# Patient Record
Sex: MALE | Race: WHITE | URBAN - NONMETROPOLITAN AREA
[De-identification: names, ages, dates, MRNs, and addresses within clinical notes are randomized per-mention and may not be internally consistent; named-entity substitution may affect disease eponyms.]

---

## 2017-12-19 ENCOUNTER — TELEPHONE (OUTPATIENT)
Dept: FAMILY MEDICINE | Facility: OTHER | Age: 42
End: 2017-12-19

## 2017-12-19 NOTE — TELEPHONE ENCOUNTER
Patient called to schedule appointment for a hospital follow-up    Patient was admitted to:  [facility]   Discharged date: 12/19/17  Reason for hospital admission: Suicidal   Does patient have future appointment scheduled with provider? Yes   Date of future appointment:  12/28/17    This information will be used to help the care team plan for the patients upcoming visit.  The triage RN may determine that a follow up call is necessary and reach out to the patient via the phone number listed in the chart.     Please route this message on routine priority to the Triage RN pool.

## 2017-12-19 NOTE — TELEPHONE ENCOUNTER
Patient is being discharged today. Will postpone message to tomorrow and follow up with patient then.     Roxanna Covarrubias RN  [facility]

## 2017-12-19 NOTE — TELEPHONE ENCOUNTER
"SUBJECTIVE:                                                      Patient presents for Hospital Followup Visit:    Hospital:  {[facility]:5424182}      Date of Admission: ***  Date of Discharge: ***  Reason(s) for Admission: ***            Problems taking medications regularly:  {NONE DEFAULTED:142684::\"None\"}       Medication changes since discharge: {NONE DEFAULTED:817474::\"None\"}       Problems adhering to non-medication therapy:  {NONE DEFAULTED:280359::\"None\"}  {roomer to stop here, delete this reminder}  Summary of hospitalization:  {HOSPITAL DISCHARGE SUMMARY INFO:483686::\"[place] hospital discharge summary reviewed\"}  Diagnostic Tests/Treatments reviewed.  Follow up needed: {NONE DEFAULTED:497581::\"none\"}  Other Healthcare Providers Involved in Patient s Care:         {those currently involved after discharge:189276::\"None\"}  Update since discharge: {IMPROVED DEFAULT:888251::\"improved.\"} {include information from family, SNF, care coordination}    ROS:  {ROS SHORT:198687}    OBJECTIVE:                                                      {SHORT EXAM:671518}    ASSESSMENT/PLAN:                                                      There are no diagnoses linked to this encounter.     Post Discharge Medication Reconciliation: {ACO Med Rec (Provider):093796}.  Issues to address: {Issues :705939}  Plan of care communicated with {Communicate Plan to:036068::\"patient\"}      Type of Medical Decision Making Face-to-Face Visit within 7 Days Face-to-Face Visit within 14 days   Moderate Complexity 80708 [id number]   High Complexity [id number] [id number]                 "

## 2017-12-20 NOTE — TELEPHONE ENCOUNTER
"Hospital/TCU/ED for chronic condition Discharge Protocol    \"Hi, my name is Roxanna Covarrubias, a registered nurse, and I am calling from [facility]  I am calling to follow up and see how things are going for you after your recent emergency visit/hospital/TCU stay.\"    Tell me how you are doing now that you are home?\" Doing good. I have no concerns at this time.\"      Discharge Instructions    \"Let's review your discharge instructions.  What is/are the follow-up recommendations?  Pt. Response: Follow up in clinic    \"Has an appointment with your primary care provider been scheduled?\"   Yes. (confirm)    \"When you see the provider, I would recommend that you bring your medications with you.\"    Medications    \"Tell me what changed about your medicines when you discharged?\"    Changes to chronic meds?    0-1    \"What questions do you have about your medications?\"    None     New diagnoses of heart failure, COPD, diabetes, or MI?    No                  Medication reconciliation completed? Yes  Was MTM referral placed (*Make sure to put transitions as reason for referral)?   No    Call Summary    \"What questions or concerns do you have about your recent visit and your follow-up care?\"     none    \"If you have questions or things don't continue to improve, we encourage you contact us through the main clinic number (give number).  Even if the clinic is not open, triage nurses are available 24/7 to help you.     We would like you to know that our clinic has extended hours (provide information).  We also have urgent care (provide details on closest location and hours/contact info)\"      \"Thank you for your time and take care!\"    Roxanna Covarrubias RN  [facility]  "

## 2017-12-28 ENCOUNTER — OFFICE VISIT (OUTPATIENT)
Dept: FAMILY MEDICINE | Facility: OTHER | Age: 42
End: 2017-12-28
Payer: COMMERCIAL

## 2017-12-28 VITALS
SYSTOLIC BLOOD PRESSURE: 132 MMHG | DIASTOLIC BLOOD PRESSURE: 82 MMHG | OXYGEN SATURATION: 99 % | HEIGHT: 68 IN | HEART RATE: 94 BPM | BODY MASS INDEX: 30.05 KG/M2 | WEIGHT: 198.3 LBS | RESPIRATION RATE: 14 BRPM | TEMPERATURE: 97.4 F

## 2017-12-28 DIAGNOSIS — F10.10 ALCOHOL ABUSE: ICD-10-CM

## 2017-12-28 DIAGNOSIS — F41.9 ANXIETY: Primary | ICD-10-CM

## 2017-12-28 PROBLEM — R45.851 SUICIDAL IDEATION: Status: ACTIVE | Noted: 2017-12-18

## 2017-12-28 PROCEDURE — 99214 OFFICE O/P EST MOD 30 MIN: CPT | Performed by: FAMILY MEDICINE

## 2017-12-28 RX ORDER — PAROXETINE 20 MG/1
20 TABLET, FILM COATED ORAL AT BEDTIME
Qty: 30 TABLET | Refills: 1 | Status: SHIPPED | OUTPATIENT
Start: 2017-12-28 | End: 2018-02-08

## 2017-12-28 ASSESSMENT — ANXIETY QUESTIONNAIRES
3. WORRYING TOO MUCH ABOUT DIFFERENT THINGS: MORE THAN HALF THE DAYS
GAD7 TOTAL SCORE: 15
5. BEING SO RESTLESS THAT IT IS HARD TO SIT STILL: NEARLY EVERY DAY
7. FEELING AFRAID AS IF SOMETHING AWFUL MIGHT HAPPEN: SEVERAL DAYS
2. NOT BEING ABLE TO STOP OR CONTROL WORRYING: MORE THAN HALF THE DAYS
IF YOU CHECKED OFF ANY PROBLEMS ON THIS QUESTIONNAIRE, HOW DIFFICULT HAVE THESE PROBLEMS MADE IT FOR YOU TO DO YOUR WORK, TAKE CARE OF THINGS AT HOME, OR GET ALONG WITH OTHER PEOPLE: VERY DIFFICULT
6. BECOMING EASILY ANNOYED OR IRRITABLE: SEVERAL DAYS
1. FEELING NERVOUS, ANXIOUS, OR ON EDGE: NEARLY EVERY DAY

## 2017-12-28 ASSESSMENT — PAIN SCALES - GENERAL: PAINLEVEL: NO PAIN (0)

## 2017-12-28 ASSESSMENT — PATIENT HEALTH QUESTIONNAIRE - PHQ9
5. POOR APPETITE OR OVEREATING: NEARLY EVERY DAY
SUM OF ALL RESPONSES TO PHQ QUESTIONS 1-9: 7

## 2017-12-28 NOTE — MR AVS SNAPSHOT
"              After Visit Summary   2017    Kevin Florian    MRN: [id number]           Patient Information     Date Of Birth          1975        Visit Information        Provider Department      2017 8:40 AM Rome Leslie MD [facility]        Today's Diagnoses     Anxiety    -  1    Alcohol abuse           Follow-ups after your visit        Follow-up notes from your care team     Return in about 1 month (around 2018) for recheck anxiety and depression.      Who to contact     If you have questions or need follow up information about today's clinic visit or your schedule please contact [facility] directly at [phone].  Normal or non-critical lab and imaging results will be communicated to you by EyeIChart, letter or phone within 4 business days after the clinic has received the results. If you do not hear from us within 7 days, please contact the clinic through EyeIChart or phone. If you have a critical or abnormal lab result, we will notify you by phone as soon as possible.  Submit refill requests through [employer] or call your pharmacy and they will forward the refill request to us. Please allow 3 business days for your refill to be completed.          Additional Information About Your Visit        MyChart Information     [employer] lets you send messages to your doctor, view your test results, renew your prescriptions, schedule appointments and more. To sign up, go to [email or web address] . Click on \"Log in\" on the left side of the screen, which will take you to the Welcome page. Then click on \"Sign up Now\" on the right side of the page.     You will be asked to enter the access code listed below, as well as some personal information. Please follow the directions to create your username and password.     Your access code is: [email or web address]  Expires: 3/28/2018  9:30 AM     Your access code will  in 90 days. If you need help or a new code, please call your " "[facility] or [phone].        Care EveryWhere ID     This is your Care EveryWhere ID. This could be used by other organizations to access your [facility] medical records  [id number]        Your Vitals Were     Pulse Temperature Respirations Height Pulse Oximetry BMI (Body Mass Index)    94 97.4  F (36.3  C) (Temporal) 14 5' 8.2\" (1.732 m) 99% 29.97 kg/m2       Blood Pressure from Last 3 Encounters:   12/28/17 132/82   05/10/16 149/81   03/01/16 135/84    Weight from Last 3 Encounters:   12/28/17 198 lb 4.8 oz (89.9 kg)   05/10/16 195 lb (88.5 kg)   03/01/16 195 lb (88.5 kg)              Today, you had the following     No orders found for display         Today's Medication Changes          These changes are accurate as of: 12/28/17  9:30 AM.  If you have any questions, ask your nurse or doctor.               Start taking these medicines.        Dose/Directions    PARoxetine 20 MG tablet   Commonly known as:  PAXIL   Used for:  Anxiety, Alcohol abuse   Started by:  Rome Leslie MD        Dose:  20 mg   Take 1 tablet (20 mg) by mouth At Bedtime   Quantity:  30 tablet   Refills:  1            Where to get your medicines      These medications were sent to [id number] - [place], [place] - [address] [place] [address]    Hours:  M-F 8:30-6:30; Sat 9-4; closed Sunday Phone:  [phone]     PARoxetine 20 MG tablet                Primary Care Provider Office Phone # Fax #    Rome Leslie [phone]       [address] [facility] [address]        Equal Access to Services     [facility] HospitalDENY AH: Hadii karissa Lagunas, waabhishekda luqadaha, qaybta kaalmada alberto, arnel hernandez. So [facility] [phone].    ATENCIÓN: Si habla español, tiene a curry disposición servicios gratuitos de asistencia lingüística. Llame al [phone].    We comply with applicable federal civil rights laws and [place] laws. We do not discriminate on the basis of race, " color, national origin, age, disability, sex, sexual orientation, or gender identity.            Thank you!     Thank you for choosing [facility]  for your care. Our goal is always to provide you with excellent care. Hearing back from our patients is one way we can continue to improve our services. Please take a few minutes to complete the written survey that you may receive in the mail after your visit with us. Thank you!             Your Updated Medication List - Protect others around you: Learn how to safely use, store and throw away your medicines at www.disposemymeds.org.          This list is accurate as of: 12/28/17  9:30 AM.  Always use your most recent med list.                   Brand Name Dispense Instructions for use Diagnosis    fluticasone 50 MCG/ACT spray    FLONASE    16 g    INHALE 2 SPRAYS INTO EACH NOSTRIL EVERY DAY    Acute recurrent maxillary sinusitis       PARoxetine 20 MG tablet    PAXIL    30 tablet    Take 1 tablet (20 mg) by mouth At Bedtime    Anxiety, Alcohol abuse

## 2017-12-28 NOTE — NURSING NOTE
"Chief Complaint   Patient presents with     RECHECK       Initial /82 (BP Location: Right arm, Patient Position: Chair, Cuff Size: Adult Large)  Pulse 94  Temp 97.4  F (36.3  C) (Temporal)  Resp 14  Ht 5' 8.2\" (1.732 m)  Wt 198 lb 4.8 oz (89.9 kg)  SpO2 99%  BMI 29.97 kg/m2 Estimated body mass index is 29.97 kg/(m^2) as calculated from the following:    Height as of this encounter: 5' 8.2\" (1.732 m).    Weight as of this encounter: 198 lb 4.8 oz (89.9 kg).  Medication Reconciliation: complete     Cathi Reynolds MA 12/28/2017  8:50 AM          "

## 2017-12-28 NOTE — PROGRESS NOTES
SUBJECTIVE:   Kevin Florian is a 42 year old male who presents to clinic today for the following health issues:            Hospital Follow-up Visit:    Hospital/Nursing Home/IP Rehab Facility: [facility]  Date of Admission: 12/17/2017  Date of Discharge: 12/19/2017  Reason(s) for Admission: Suicidal Ideation            Problems taking medications regularly:  None       Medication changes since discharge: None       Problems adhering to non-medication therapy:  None    Summary of hospitalization:  CareEverywhere information obtained and reviewed  Discharge summary unavailable  Diagnostic Tests/Treatments reviewed.  Follow up needed: He has appointment with [facility] counseling.   Other Healthcare Providers Involved in Patient s Care:         None  Update since discharge: stable. He has been sober for 10 days. He admits to chronic alcohol use, 35 shots/week. The longest he will go between drinking is 2-3 days. He has never undergone chemical dependency evaluation.    Post Discharge Medication Reconciliation: discharge medications reconciled, continue medications without change.  Plan of care communicated with patient            PHQ-9 SCORE 12/28/2017   Total Score 7         Abnormal Mood Symptoms      Duration: months    Description:  Depression: YES  Anxiety: YES  Panic attacks: no     Accompanying signs and symptoms: see PHQ-9 and LEXA scores    History (similar episodes/previous evaluation): None    Precipitating or alleviating factors: Concern for job loss, argument with wife.    Therapies tried and outcome: none        Problem list and histories reviewed & adjusted, as indicated.  Additional history: as documented    Patient Active Problem List   Diagnosis     CARDIOVASCULAR SCREENING; LDL GOAL LESS THAN 160     Suicidal ideation     Past Surgical History:   Procedure Laterality Date     HERNIA REPAIR, INGUINAL RT/LT  1995    Left     LAPAROSCOPIC HERNIORRHAPHY INGUINAL Left 12/17/2015     Procedure: LAPAROSCOPIC HERNIORRHAPHY INGUINAL;  Surgeon: Larry Herrera MD;  Location: [facility] OR     VASECTOMY         Social History   Substance Use Topics     Smoking status: Never Smoker     Smokeless tobacco: Current User     Alcohol use 0.0 oz/week     0 Standard drinks or equivalent per week     Family History   Problem Relation Age of Onset     Hypertension Father      Hyperlipidemia Father      Prostate Cancer Father      Other Cancer Paternal Grandfather      melanoma     Anxiety Disorder Brother      DIABETES No family hx of      Coronary Artery Disease No family hx of      CEREBROVASCULAR DISEASE No family hx of      Breast Cancer No family hx of      Colon Cancer No family hx of      Substance Abuse No family hx of      Anesthesia Reaction No family hx of          Current Outpatient Prescriptions   Medication Sig Dispense Refill     fluticasone (FLONASE) 50 MCG/ACT nasal spray INHALE 2 SPRAYS INTO EACH NOSTRIL EVERY DAY 16 g 3     No Known Allergies  Recent Labs   Lab Test  11/01/15   1640   CR  0.96   GFRESTIMATED  87   GFRESTBLACK  >90   GFR Calc     POTASSIUM  3.3*      BP Readings from Last 3 Encounters:   12/28/17 132/82   05/10/16 149/81   03/01/16 135/84    Wt Readings from Last 3 Encounters:   12/28/17 198 lb 4.8 oz (89.9 kg)   05/10/16 195 lb (88.5 kg)   03/01/16 195 lb (88.5 kg)                          Reviewed and updated as needed this visit by clinical staffTobacco  Allergies  Meds  Med Hx  Surg Hx  Fam Hx  Soc Hx      Reviewed and updated as needed this visit by Provider  Meds         ROS:  C: NEGATIVE for fever, chills, change in weight  E/M: NEGATIVE for ear, mouth and throat problems  R: NEGATIVE for significant cough or SOB  CV: NEGATIVE for chest pain, palpitations or peripheral edema  PSYCHIATRIC: POSITIVE foralcohol abuse, anhedonia, anxiety, concentration difficulty, depressed mood, insomnia and thoughts of self harm and NEGATIVE forsuicidal thoughts  "with specific plan and thoughts of hurting someone else  ROS otherwise negative    OBJECTIVE:     /82 (BP Location: Right arm, Patient Position: Chair, Cuff Size: Adult Large)  Pulse 94  Temp 97.4  F (36.3  C) (Temporal)  Resp 14  Ht 5' 8.2\" (1.732 m)  Wt 198 lb 4.8 oz (89.9 kg)  SpO2 99%  BMI 29.97 kg/m2  Body mass index is 29.97 kg/(m^2).  GENERAL: healthy, alert and no distress  NECK: no adenopathy, no asymmetry, masses, or scars and thyroid normal to palpation  RESP: lungs clear to auscultation - no rales, rhonchi or wheezes  CV: regular rate and rhythm, normal S1 S2, no S3 or S4, no murmur, click or rub, no peripheral edema and peripheral pulses strong  ABDOMEN: soft, nontender, no hepatosplenomegaly, no masses and bowel sounds normal  PSYCH: mentation appears normal, affect flat, judgement and insight intact and appearance well groomed    Diagnostic Test Results:  none     ASSESSMENT/PLAN:         1. Anxiety  Chronic and self medication with alcohol. Start Paxil at bedtime. Encourage total alcohol abstinence. He will follow up with [facility] counseling and I recommend he contact [facility] chemical dependency evaluation and support.   - PARoxetine (PAXIL) 20 MG tablet; Take 1 tablet (20 mg) by mouth At Bedtime  Dispense: 30 tablet; Refill: 1    2. Alcohol abuse  Chronic anxiety and depression and self medication with alcohol. Start Paxil at bedtime. Encourage total alcohol abstinence. He will follow up with [facility] counseling and I recommend he contact [facility] chemical dependency evaluation and support.     - PARoxetine (PAXIL) 20 MG tablet; Take 1 tablet (20 mg) by mouth At Bedtime  Dispense: 30 tablet; Refill: 1    FUTURE APPOINTMENTS:       - Follow-up visit in 1 month.  Work on weight loss  Regular exercise  Total alcohol abstinence.    Rome Leslie MD  [facility]"

## 2017-12-29 ASSESSMENT — ANXIETY QUESTIONNAIRES: GAD7 TOTAL SCORE: 15

## 2018-02-08 ENCOUNTER — OFFICE VISIT (OUTPATIENT)
Dept: FAMILY MEDICINE | Facility: OTHER | Age: 43
End: 2018-02-08
Payer: COMMERCIAL

## 2018-02-08 VITALS
HEART RATE: 105 BPM | BODY MASS INDEX: 29.82 KG/M2 | WEIGHT: 197.3 LBS | TEMPERATURE: 96.9 F | RESPIRATION RATE: 16 BRPM | SYSTOLIC BLOOD PRESSURE: 122 MMHG | OXYGEN SATURATION: 98 % | DIASTOLIC BLOOD PRESSURE: 90 MMHG

## 2018-02-08 DIAGNOSIS — F10.10 ALCOHOL ABUSE: ICD-10-CM

## 2018-02-08 DIAGNOSIS — F41.9 ANXIETY: ICD-10-CM

## 2018-02-08 PROCEDURE — 99213 OFFICE O/P EST LOW 20 MIN: CPT | Performed by: FAMILY MEDICINE

## 2018-02-08 RX ORDER — PAROXETINE 20 MG/1
30 TABLET, FILM COATED ORAL AT BEDTIME
Qty: 45 TABLET | Refills: 2 | Status: SHIPPED | OUTPATIENT
Start: 2018-02-08 | End: 2018-08-21

## 2018-02-08 ASSESSMENT — ANXIETY QUESTIONNAIRES
3. WORRYING TOO MUCH ABOUT DIFFERENT THINGS: NOT AT ALL
2. NOT BEING ABLE TO STOP OR CONTROL WORRYING: NOT AT ALL
1. FEELING NERVOUS, ANXIOUS, OR ON EDGE: NOT AT ALL
6. BECOMING EASILY ANNOYED OR IRRITABLE: NOT AT ALL
GAD7 TOTAL SCORE: 1
5. BEING SO RESTLESS THAT IT IS HARD TO SIT STILL: NOT AT ALL
7. FEELING AFRAID AS IF SOMETHING AWFUL MIGHT HAPPEN: NOT AT ALL
IF YOU CHECKED OFF ANY PROBLEMS ON THIS QUESTIONNAIRE, HOW DIFFICULT HAVE THESE PROBLEMS MADE IT FOR YOU TO DO YOUR WORK, TAKE CARE OF THINGS AT HOME, OR GET ALONG WITH OTHER PEOPLE: NOT DIFFICULT AT ALL

## 2018-02-08 ASSESSMENT — PAIN SCALES - GENERAL: PAINLEVEL: NO PAIN (0)

## 2018-02-08 ASSESSMENT — PATIENT HEALTH QUESTIONNAIRE - PHQ9: 5. POOR APPETITE OR OVEREATING: SEVERAL DAYS

## 2018-02-08 NOTE — MR AVS SNAPSHOT
"              After Visit Summary   2018    Kevin Florian    MRN: [id number]           Patient Information     Date Of Birth          1975        Visit Information        Provider Department      2018 1:50 PM Rome Leslie MD [facility]        Today's Diagnoses     Anxiety        Alcohol abuse           Follow-ups after your visit        Follow-up notes from your care team     Return in about 6 weeks (around 3/22/2018) for recheck depression and anxiety.      Who to contact     If you have questions or need follow up information about today's clinic visit or your schedule please contact [facility] directly at [phone].  Normal or non-critical lab and imaging results will be communicated to you by Bizzbyhart, letter or phone within 4 business days after the clinic has received the results. If you do not hear from us within 7 days, please contact the clinic through Bizz[employer] or phone. If you have a critical or abnormal lab result, we will notify you by phone as soon as possible.  Submit refill requests through [employer] or call your pharmacy and they will forward the refill request to us. Please allow 3 business days for your refill to be completed.          Additional Information About Your Visit        MyChart Information     [employer] lets you send messages to your doctor, view your test results, renew your prescriptions, schedule appointments and more. To sign up, go to [email or web address] . Click on \"Log in\" on the left side of the screen, which will take you to the Welcome page. Then click on \"Sign up Now\" on the right side of the page.     You will be asked to enter the access code listed below, as well as some personal information. Please follow the directions to create your username and password.     Your access code is: [email or web address]  Expires: 3/28/2018  9:30 AM     Your access code will  in 90 days. If you need help or a new code, please call your [facility] " clinic or [phone].        Care EveryWhere ID     This is your Care EveryWhere ID. This could be used by other organizations to access your [facility] medical records  [id number]        Your Vitals Were     Pulse Temperature Respirations Pulse Oximetry BMI (Body Mass Index)       105 96.9  F (36.1  C) (Temporal) 16 98% 29.82 kg/m2        Blood Pressure from Last 3 Encounters:   02/08/18 122/90   12/28/17 132/82   05/10/16 149/81    Weight from Last 3 Encounters:   02/08/18 197 lb 4.8 oz (89.5 kg)   12/28/17 198 lb 4.8 oz (89.9 kg)   05/10/16 195 lb (88.5 kg)              Today, you had the following     No orders found for display         Today's Medication Changes          These changes are accurate as of 2/8/18  2:06 PM.  If you have any questions, ask your nurse or doctor.               These medicines have changed or have updated prescriptions.        Dose/Directions    PARoxetine 20 MG tablet   Commonly known as:  PAXIL   This may have changed:  how much to take   Used for:  Anxiety, Alcohol abuse   Changed by:  Rome Leslie MD        Dose:  30 mg   Take 1.5 tablets (30 mg) by mouth At Bedtime   Quantity:  45 tablet   Refills:  2            Where to get your medicines      These medications were sent to [facility] - [place], [place] - [address] [place] [address] [address] [facility] [address]     Phone:  [phone]     PARoxetine 20 MG tablet                Primary Care Provider Office Phone # Fax #    Rome Leslie [phone]       [address] [facility] [address]        Equal Access to Services     LESTER BENNETT : Malini Lagunas, dash bravo, arnel parker. So [facility] [phone].    ATENCIÓN: Si habla español, tiene a curry disposición servicios gratuitos de asistencia lingüística. Llame al [phone].    We comply with applicable federal civil rights laws  and [place] laws. We do not discriminate on the basis of race, color, national origin, age, disability, sex, sexual orientation, or gender identity.            Thank you!     Thank you for choosing [facility]  for your care. Our goal is always to provide you with excellent care. Hearing back from our patients is one way we can continue to improve our services. Please take a few minutes to complete the written survey that you may receive in the mail after your visit with us. Thank you!             Your Updated Medication List - Protect others around you: Learn how to safely use, store and throw away your medicines at www.disposemymeds.org.          This list is accurate as of 2/8/18  2:06 PM.  Always use your most recent med list.                   Brand Name Dispense Instructions for use Diagnosis    fluticasone 50 MCG/ACT spray    FLONASE    16 g    INHALE 2 SPRAYS INTO EACH NOSTRIL EVERY DAY    Acute recurrent maxillary sinusitis       PARoxetine 20 MG tablet    PAXIL    45 tablet    Take 1.5 tablets (30 mg) by mouth At Bedtime    Anxiety, Alcohol abuse

## 2018-02-08 NOTE — NURSING NOTE
"Chief Complaint   Patient presents with     RECHECK       Initial /90 (BP Location: Left arm, Patient Position: Chair, Cuff Size: Adult Large)  Pulse 105  Temp 96.9  F (36.1  C) (Temporal)  Resp 16  Wt 197 lb 4.8 oz (89.5 kg)  SpO2 98%  BMI 29.82 kg/m2 Estimated body mass index is 29.82 kg/(m^2) as calculated from the following:    Height as of 12/28/17: 5' 8.2\" (1.732 m).    Weight as of this encounter: 197 lb 4.8 oz (89.5 kg).  Medication Reconciliation: complete     Cathi Reynolds MA 2/8/2018  1:50 PM          "

## 2018-02-09 ASSESSMENT — ANXIETY QUESTIONNAIRES: GAD7 TOTAL SCORE: 1

## 2018-02-09 ASSESSMENT — PATIENT HEALTH QUESTIONNAIRE - PHQ9: SUM OF ALL RESPONSES TO PHQ QUESTIONS 1-9: 4

## 2018-04-13 ENCOUNTER — OFFICE VISIT (OUTPATIENT)
Dept: FAMILY MEDICINE | Facility: OTHER | Age: 43
End: 2018-04-13
Payer: COMMERCIAL

## 2018-04-13 VITALS
BODY MASS INDEX: 31.74 KG/M2 | HEART RATE: 82 BPM | RESPIRATION RATE: 16 BRPM | SYSTOLIC BLOOD PRESSURE: 136 MMHG | HEIGHT: 68 IN | DIASTOLIC BLOOD PRESSURE: 82 MMHG | TEMPERATURE: 96.9 F | OXYGEN SATURATION: 96 % | WEIGHT: 209.4 LBS

## 2018-04-13 DIAGNOSIS — H72.92: Primary | ICD-10-CM

## 2018-04-13 PROCEDURE — 99213 OFFICE O/P EST LOW 20 MIN: CPT | Performed by: FAMILY MEDICINE

## 2018-04-13 RX ORDER — OFLOXACIN 3 MG/ML
5 SOLUTION AURICULAR (OTIC) 2 TIMES DAILY
Qty: 4 ML | Refills: 0 | Status: SHIPPED | OUTPATIENT
Start: 2018-04-13 | End: 2018-04-20

## 2018-04-13 ASSESSMENT — PAIN SCALES - GENERAL: PAINLEVEL: NO PAIN (0)

## 2018-04-13 NOTE — PROGRESS NOTES
SUBJECTIVE:   Kevin Florian is a 43 year old male who presents to clinic today for the following health issues:    Concern - left ear problem  Onset: 1 day    Description:   Patient states that last night he was cleaning his ear and accidentally shoved the q-tip in his left ear. When this happened a lot pop and then it started to bleed.  There was pain last night when it happened. This morning there was blood on his pillow.  He states that his ear has been bleeding some all day. He denies any pain but does state that there is some pressure. States that he cannot hear out of the left ear.    Intensity: mild    Progression of Symptoms:  same    Accompanying Signs & Symptoms:  Blood, pressure, not able to hear    Previous history of similar problem:   n/a    Precipitating factors:   Worsened by: n/a    Alleviating factors:  Improved by: n/a    Therapies Tried and outcome: advil when it happened    Problem list and histories reviewed & adjusted, as indicated.  Additional history: as documented    Patient Active Problem List   Diagnosis     CARDIOVASCULAR SCREENING; LDL GOAL LESS THAN 160     Suicidal ideation     Alcohol abuse     Anxiety     Past Surgical History:   Procedure Laterality Date     HERNIA REPAIR, INGUINAL RT/LT  1995    Left     LAPAROSCOPIC HERNIORRHAPHY INGUINAL Left 12/17/2015    Procedure: LAPAROSCOPIC HERNIORRHAPHY INGUINAL;  Surgeon: Larry Herrera MD;  Location: [facility]     VASECTOMY         Social History   Substance Use Topics     Smoking status: Never Smoker     Smokeless tobacco: Current User     Alcohol use 0.0 oz/week     0 Standard drinks or equivalent per week      Comment: occasionally     Family History   Problem Relation Age of Onset     Hypertension Father      Hyperlipidemia Father      Prostate Cancer Father      Other Cancer Paternal Grandfather      melanoma     Anxiety Disorder Brother      DIABETES No family hx of      Coronary Artery Disease No family hx of       "CEREBROVASCULAR DISEASE No family hx of      Breast Cancer No family hx of      Colon Cancer No family hx of      Substance Abuse No family hx of      Anesthesia Reaction No family hx of          Current Outpatient Prescriptions   Medication Sig Dispense Refill     PARoxetine (PAXIL) 20 MG tablet Take 1.5 tablets (30 mg) by mouth At Bedtime 45 tablet 2     fluticasone (FLONASE) 50 MCG/ACT nasal spray INHALE 2 SPRAYS INTO EACH NOSTRIL EVERY DAY 16 g 3     No Known Allergies  Recent Labs   Lab Test  11/01/15   1640   CR  0.96   GFRESTIMATED  87   GFRESTBLACK  >90   GFR Calc     POTASSIUM  3.3*      BP Readings from Last 3 Encounters:   04/13/18 136/82   02/08/18 122/90   12/28/17 132/82    Wt Readings from Last 3 Encounters:   04/13/18 209 lb 6.4 oz (95 kg)   02/08/18 197 lb 4.8 oz (89.5 kg)   12/28/17 198 lb 4.8 oz (89.9 kg)                  Labs reviewed in EPIC    Reviewed and updated as needed this visit by clinical staff  Tobacco  Allergies  Meds  Problems  Soc Hx      Reviewed and updated as needed this visit by Provider  Allergies  Meds  Problems         ROS:  Constitutional, HEENT, cardiovascular, pulmonary, gi and gu systems are negative, except as otherwise noted.    OBJECTIVE:     /82 (BP Location: Left arm, Patient Position: Sitting, Cuff Size: Adult Regular)  Pulse 82  Temp 96.9  F (36.1  C) (Temporal)  Resp 16  Ht 5' 8\" (1.727 m)  Wt 209 lb 6.4 oz (95 kg)  SpO2 96%  BMI 31.84 kg/m2  Body mass index is 31.84 kg/(m^2).  GENERAL: healthy, alert and no distress  HENT: normal cephalic/atraumatic, right ear: normal: no effusions, no erythema, normal landmarks, left ear: perforation 20% anterior inferior quadrant with bloody drainage, nose and mouth without ulcers or lesions, oropharynx clear and oral mucous membranes moist  NECK: no adenopathy, no asymmetry, masses, or scars and thyroid normal to palpation  RESP: lungs clear to auscultation - no rales, rhonchi or " wheezes  CV: regular rate and rhythm, normal S1 S2, no S3 or S4, no murmur, click or rub, no peripheral edema and peripheral pulses strong    Diagnostic Test Results:  none     ASSESSMENT/PLAN:       1. Rupture of tympanic membrane, left  Acute traumatic rupture of TM. Start Floxin otic drops. Avoid water in ear. Referral to ENT for follow up of healing to determine if patch is necessary.  - ofloxacin (FLOXIN) 0.3 % otic solution; Place 5 drops Into the left ear 2 times daily for 7 days  Dispense: 4 mL; Refill: 0  - OTOLARYNGOLOGY REFERRAL    Patient Instructions       Eardrum Rupture (Perforation)    Your eardrum is a thin membrane between your outer and middle ear. Sound waves entering your ear cause the membrane to vibrate. This helps you hear. An injury or infection can cause your eardrum to tear (rupture). This creates a hole (perforation) that may affect your hearing.  Causes of eardrum perforation  Causes of a ruptured eardrum include:    Pressure from an ear infection    Putting an object such as a cotton swab or pencil into the ear    A very loud noise such as a gunshot close to the ear    Rapid changes in air pressure. These can happen during scuba diving or traveling at high altitudes.    A slap or blow to the ear  When to go to the emergency room (ER)  Seek medical care right away if you:    Have severe pain, bleeding, or ringing in your ear.    Lose your hearing suddenly.    Become very dizzy for no reason.    Have an object lodged in your ear.  A ruptured eardrum from an ear infection usually isn't an emergency. In fact, the rupture often relieves pressure and pain. It usually heals within hours or days. But you should have the ear looked at by a healthcare provider within 24 hours.  What to expect in the ER  Your ear will be examined. Treatment will depend on how severe the damage is. Small holes often heal on their own. A small patch may be placed over a minor eardrum tear. Large tears may need to be  repaired during an operation. If you are very dizzy or have severe hearing loss, you are likely to stay in the hospital for treatment for one or more days.  Don't clean inside the ear canal with cotton swabs or any other object.   Date Last Reviewed: 10/1/2016    3253-9014 [employer]. [address] [place] [address]. All rights reserved. This information is not intended as a substitute for professional medical care. Always follow your healthcare professional's instructions.        Ruptured Eardrum, Traumatic    The eardrum is a thin membrane about one inch from the opening of the ear canal. It is easily injured by objects put into the ear canal. It may also be injured by a loud noise close to the ear or a slap to the ear. A ruptured eardrum will cause pain. There may be some clear or bloody drainage. A buzzing sound may be heard in the ear. Some hearing may be lost the affected ear.  The goal is to keep the ear dry and clean until the eardrum heals. Antibiotics may be prescribed if infection is present. Follow-up with ear and hearing specialists is advised. In many cases, hearing returns to normal after the eardrum heals.  Home care    Keep a cotton ball in the ear to keep it clean and protect the inner ear.    Do not use eardrops unless prescribed by the healthcare provider.    Do not get water in your ear when showering or bathing. No swimming until approved by the healthcare provider.    Take any prescription or over-the-counter medicines as advised.  Follow-up care  Follow up with specialists for test or exams as directed. A hearing test should be done soon after the injury. Also follow up within 2 weeks to check healing of the eardrum.  When to seek medical advice  Fever in children:    Child is 3 months old or younger and has a fever of 100.4 F (38 C) or higher. (Get medical care right away. Fever in a young baby can be a sign of a dangerous infection.)    Child is younger than 2 years  of age and has a fever of 100.4 F (38 C) that continues for more than 1 day.    Child is 2 years old or older and has a fever of 100.4 F (38 C) that continues for more than 3 days.    Child is of any age and has repeated fevers above 104 F (40 C).  Fever in adults:    Fever of 100.4 F (38 C)  Also call for any of the following:    Fluid drainage from the ear for longer than 24 hours    Increasing ear pain    Worsening headache or dizziness    Worsening hearing loss  Date Last Reviewed: 5/3/2015    3787-2131 [employer]. [address] [place] [address]. All rights reserved. This information is not intended as a substitute for professional medical care. Always follow your healthcare professional's instructions.            Rome Leslie MD  [facility]

## 2018-04-13 NOTE — PATIENT INSTRUCTIONS
Eardrum Rupture (Perforation)    Your eardrum is a thin membrane between your outer and middle ear. Sound waves entering your ear cause the membrane to vibrate. This helps you hear. An injury or infection can cause your eardrum to tear (rupture). This creates a hole (perforation) that may affect your hearing.  Causes of eardrum perforation  Causes of a ruptured eardrum include:    Pressure from an ear infection    Putting an object such as a cotton swab or pencil into the ear    A very loud noise such as a gunshot close to the ear    Rapid changes in air pressure. These can happen during scuba diving or traveling at high altitudes.    A slap or blow to the ear  When to go to the emergency room (ER)  Seek medical care right away if you:    Have severe pain, bleeding, or ringing in your ear.    Lose your hearing suddenly.    Become very dizzy for no reason.    Have an object lodged in your ear.  A ruptured eardrum from an ear infection usually isn't an emergency. In fact, the rupture often relieves pressure and pain. It usually heals within hours or days. But you should have the ear looked at by a healthcare provider within 24 hours.  What to expect in the ER  Your ear will be examined. Treatment will depend on how severe the damage is. Small holes often heal on their own. A small patch may be placed over a minor eardrum tear. Large tears may need to be repaired during an operation. If you are very dizzy or have severe hearing loss, you are likely to stay in the hospital for treatment for one or more days.  Don't clean inside the ear canal with cotton swabs or any other object.   Date Last Reviewed: 10/1/2016    5874-1863 [employer]. [address] [place] [address], [place] [address]. All rights reserved. This information is not intended as a substitute for professional medical care. Always follow your healthcare professional's instructions.        Ruptured Eardrum, Traumatic    The eardrum is a thin membrane  about one inch from the opening of the ear canal. It is easily injured by objects put into the ear canal. It may also be injured by a loud noise close to the ear or a slap to the ear. A ruptured eardrum will cause pain. There may be some clear or bloody drainage. A buzzing sound may be heard in the ear. Some hearing may be lost the affected ear.  The goal is to keep the ear dry and clean until the eardrum heals. Antibiotics may be prescribed if infection is present. Follow-up with ear and hearing specialists is advised. In many cases, hearing returns to normal after the eardrum heals.  Home care    Keep a cotton ball in the ear to keep it clean and protect the inner ear.    Do not use eardrops unless prescribed by the healthcare provider.    Do not get water in your ear when showering or bathing. No swimming until approved by the healthcare provider.    Take any prescription or over-the-counter medicines as advised.  Follow-up care  Follow up with specialists for test or exams as directed. A hearing test should be done soon after the injury. Also follow up within 2 weeks to check healing of the eardrum.  When to seek medical advice  Fever in children:    Child is 3 months old or younger and has a fever of 100.4 F (38 C) or higher. (Get medical care right away. Fever in a young baby can be a sign of a dangerous infection.)    Child is younger than 2 years of age and has a fever of 100.4 F (38 C) that continues for more than 1 day.    Child is 2 years old or older and has a fever of 100.4 F (38 C) that continues for more than 3 days.    Child is of any age and has repeated fevers above 104 F (40 C).  Fever in adults:    Fever of 100.4 F (38 C)  Also call for any of the following:    Fluid drainage from the ear for longer than 24 hours    Increasing ear pain    Worsening headache or dizziness    Worsening hearing loss  Date Last Reviewed: 5/3/2015    3817-8736 [employer]. [address] [place] [address]  [place] [address]. All rights reserved. This information is not intended as a substitute for professional medical care. Always follow your healthcare professional's instructions.

## 2018-04-13 NOTE — MR AVS SNAPSHOT
After Visit Summary   4/13/2018    Kevin Florian    MRN: [id number]           Patient Information     Date Of Birth          1975        Visit Information        Provider Department      4/13/2018 2:10 PM Rome Leslie MD [facility]        Today's Diagnoses     Rupture of tympanic membrane, left    -  1      Care Instructions      Eardrum Rupture (Perforation)    Your eardrum is a thin membrane between your outer and middle ear. Sound waves entering your ear cause the membrane to vibrate. This helps you hear. An injury or infection can cause your eardrum to tear (rupture). This creates a hole (perforation) that may affect your hearing.  Causes of eardrum perforation  Causes of a ruptured eardrum include:    Pressure from an ear infection    Putting an object such as a cotton swab or pencil into the ear    A very loud noise such as a gunshot close to the ear    Rapid changes in air pressure. These can happen during scuba diving or traveling at high altitudes.    A slap or blow to the ear  When to go to the emergency room (ER)  Seek medical care right away if you:    Have severe pain, bleeding, or ringing in your ear.    Lose your hearing suddenly.    Become very dizzy for no reason.    Have an object lodged in your ear.  A ruptured eardrum from an ear infection usually isn't an emergency. In fact, the rupture often relieves pressure and pain. It usually heals within hours or days. But you should have the ear looked at by a healthcare provider within 24 hours.  What to expect in the ER  Your ear will be examined. Treatment will depend on how severe the damage is. Small holes often heal on their own. A small patch may be placed over a minor eardrum tear. Large tears may need to be repaired during an operation. If you are very dizzy or have severe hearing loss, you are likely to stay in the hospital for treatment for one or more days.  Don't clean inside the ear canal with cotton  swabs or any other object.   Date Last Reviewed: 10/1/2016    2515-5354 [employer]. [address] [place] [address], [place] [address]. All rights reserved. This information is not intended as a substitute for professional medical care. Always follow your healthcare professional's instructions.        Ruptured Eardrum, Traumatic    The eardrum is a thin membrane about one inch from the opening of the ear canal. It is easily injured by objects put into the ear canal. It may also be injured by a loud noise close to the ear or a slap to the ear. A ruptured eardrum will cause pain. There may be some clear or bloody drainage. A buzzing sound may be heard in the ear. Some hearing may be lost the affected ear.  The goal is to keep the ear dry and clean until the eardrum heals. Antibiotics may be prescribed if infection is present. Follow-up with ear and hearing specialists is advised. In many cases, hearing returns to normal after the eardrum heals.  Home care    Keep a cotton ball in the ear to keep it clean and protect the inner ear.    Do not use eardrops unless prescribed by the healthcare provider.    Do not get water in your ear when showering or bathing. No swimming until approved by the healthcare provider.    Take any prescription or over-the-counter medicines as advised.  Follow-up care  Follow up with specialists for test or exams as directed. A hearing test should be done soon after the injury. Also follow up within 2 weeks to check healing of the eardrum.  When to seek medical advice  Fever in children:    Child is 3 months old or younger and has a fever of 100.4 F (38 C) or higher. (Get medical care right away. Fever in a young baby can be a sign of a dangerous infection.)    Child is younger than 2 years of age and has a fever of 100.4 F (38 C) that continues for more than 1 day.    Child is 2 years old or older and has a fever of 100.4 F (38 C) that continues for more than 3 days.    Child is of any  age and has repeated fevers above 104 F (40 C).  Fever in adults:    Fever of 100.4 F (38 C)  Also call for any of the following:    Fluid drainage from the ear for longer than 24 hours    Increasing ear pain    Worsening headache or dizziness    Worsening hearing loss  Date Last Reviewed: 5/3/2015    2655-7856 [employer]. [address] [place] [address]. All rights reserved. This information is not intended as a substitute for professional medical care. Always follow your healthcare professional's instructions.                Follow-ups after your visit        Additional Services     OTOLARYNGOLOGY REFERRAL       Your provider has referred you to: [facility]: [facility] [phone]   [email or web address]    Please be aware that coverage of these services is subject to the terms and limitations of your health insurance plan.  Call member services at your health plan with any benefit or coverage questions.      Please bring the following with you to your appointment:    (1) Any X-Rays, CTs or MRIs which have been performed.  Contact the facility where they were done to arrange for  prior to your scheduled appointment.   (2) List of current medications  (3) This referral request   (4) Any documents/labs given to you for this referral                  Who to contact     If you have questions or need follow up information about today's clinic visit or your schedule please contact [facility] directly at [phone].  Normal or non-critical lab and imaging results will be communicated to you by MyChart, letter or phone within 4 business days after the clinic has received the results. If you do not hear from us within 7 days, please contact the clinic through [employer] or phone. If you have a critical or abnormal lab result, we will notify you by phone as soon as possible.  Submit refill requests through [employer]  "or call your pharmacy and they will forward the refill request to us. Please allow 3 business days for your refill to be completed.          Additional Information About Your Visit        MyChart Information     [employer] lets you send messages to your doctor, view your test results, renew your prescriptions, schedule appointments and more. To sign up, go to [email or web address] . Click on \"Log in\" on the left side of the screen, which will take you to the Welcome page. Then click on \"Sign up Now\" on the right side of the page.     You will be asked to enter the access code listed below, as well as some personal information. Please follow the directions to create your username and password.     Your access code is: [email or web address]  Expires: 2018  2:36 PM     Your access code will  in 90 days. If you need help or a new code, please call your [place] clinic or [phone].        Care EveryWhere ID     This is your Care EveryWhere ID. This could be used by other organizations to access your [facility] medical records  [id number]        Your Vitals Were     Pulse Temperature Respirations Height Pulse Oximetry BMI (Body Mass Index)    82 96.9  F (36.1  C) (Temporal) 16 5' 8\" (1.727 m) 96% 31.84 kg/m2       Blood Pressure from Last 3 Encounters:   18 136/82   18 122/90   17 132/82    Weight from Last 3 Encounters:   18 209 lb 6.4 oz (95 kg)   18 197 lb 4.8 oz (89.5 kg)   17 198 lb 4.8 oz (89.9 kg)              We Performed the Following     OTOLARYNGOLOGY REFERRAL          Today's Medication Changes          These changes are accurate as of 18  2:36 PM.  If you have any questions, ask your nurse or doctor.               Start taking these medicines.        Dose/Directions    ofloxacin 0.3 % otic solution   Commonly known as:  FLOXIN   Used for:  Rupture of tympanic membrane, left   Started by:  Rome Leslie MD        Dose:  5 drop   Place 5 drops Into the left ear 2 " times daily for 7 days   Quantity:  4 mL   Refills:  0            Where to get your medicines      These medications were sent to [facility] - [place], [place] - [address] [place] [address]  [address] [facility] [address]    Hours:  M-F 8:30-6:30; Sat 9-4; closed Sunday Phone:  [phone]     ofloxacin 0.3 % otic solution                Primary Care Provider Office Phone # Fax #    Rome Leslie [phone]       [address] [facility] [address]        Equal Access to Services     YAKELIN BENNETT : Hadii aad ku hadasho Soomaali, waaxda luqadaha, qaybta kaalmada adeegyada, waxay idiin haykorin sedrick snow . [facility] [phone].    ATENCIÓN: Si habla español, tiene a curry disposición servicios gratuitos de asistencia lingüística. [facility] [phone].    We comply with applicable federal civil rights laws and [place] laws. We do not discriminate on the basis of race, color, national origin, age, disability, sex, sexual orientation, or gender identity.            Thank you!     Thank you for choosing [facility]  for your care. Our goal is always to provide you with excellent care. Hearing back from our patients is one way we can continue to improve our services. Please take a few minutes to complete the written survey that you may receive in the mail after your visit with us. Thank you!             Your Updated Medication List - Protect others around you: Learn how to safely use, store and throw away your medicines at www.disposemymeds.org.          This list is accurate as of 4/13/18  2:36 PM.  Always use your most recent med list.                   Brand Name Dispense Instructions for use Diagnosis    fluticasone 50 MCG/ACT spray    FLONASE    16 g    INHALE 2 SPRAYS INTO EACH NOSTRIL EVERY DAY    Acute recurrent maxillary sinusitis       ofloxacin 0.3 % otic solution    FLOXIN    4 mL    Place 5 drops Into the left ear 2 times daily for 7 days    Rupture of tympanic membrane, left        PARoxetine 20 MG tablet    PAXIL    45 tablet    Take 1.5 tablets (30 mg) by mouth At Bedtime    Anxiety, Alcohol abuse

## 2018-04-13 NOTE — NURSING NOTE
"Chief Complaint   Patient presents with     Ear Problem     left       Initial /82 (BP Location: Left arm, Patient Position: Sitting, Cuff Size: Adult Regular)  Pulse 82  Temp 96.9  F (36.1  C) (Temporal)  Resp 16  Ht 5' 8\" (1.727 m)  Wt 209 lb 6.4 oz (95 kg)  SpO2 96%  BMI 31.84 kg/m2 Estimated body mass index is 31.84 kg/(m^2) as calculated from the following:    Height as of this encounter: 5' 8\" (1.727 m).    Weight as of this encounter: 209 lb 6.4 oz (95 kg).  Medication Reconciliation: complete     Health Maintenance Due   Topic Date Due     LIPID SCREEN Q5 YR MALE (SYSTEM ASSIGNED)  03/22/2011     Leslie Thurman CMA      "

## 2018-04-25 ENCOUNTER — OFFICE VISIT (OUTPATIENT)
Dept: OTOLARYNGOLOGY | Facility: CLINIC | Age: 43
End: 2018-04-25
Payer: COMMERCIAL

## 2018-04-25 ENCOUNTER — OFFICE VISIT (OUTPATIENT)
Dept: AUDIOLOGY | Facility: CLINIC | Age: 43
End: 2018-04-25
Payer: COMMERCIAL

## 2018-04-25 VITALS
HEART RATE: 71 BPM | HEIGHT: 68 IN | TEMPERATURE: 98 F | WEIGHT: 211.4 LBS | RESPIRATION RATE: 16 BRPM | DIASTOLIC BLOOD PRESSURE: 103 MMHG | BODY MASS INDEX: 32.04 KG/M2 | OXYGEN SATURATION: 97 % | SYSTOLIC BLOOD PRESSURE: 145 MMHG

## 2018-04-25 DIAGNOSIS — H90.12 CONDUCTIVE HEARING LOSS OF LEFT EAR WITH UNRESTRICTED HEARING OF RIGHT EAR: Primary | ICD-10-CM

## 2018-04-25 DIAGNOSIS — H72.92 PERFORATION OF TYMPANIC MEMBRANE, LEFT: Primary | ICD-10-CM

## 2018-04-25 DIAGNOSIS — H72.92 PERFORATED TYMPANIC MEMBRANE, LEFT: ICD-10-CM

## 2018-04-25 DIAGNOSIS — H90.2 CONDUCTIVE HEARING LOSS, UNILATERAL: ICD-10-CM

## 2018-04-25 PROCEDURE — 92557 COMPREHENSIVE HEARING TEST: CPT | Performed by: AUDIOLOGIST

## 2018-04-25 PROCEDURE — 99207 ZZC NO CHARGE LOS: CPT | Performed by: AUDIOLOGIST

## 2018-04-25 PROCEDURE — 99243 OFF/OP CNSLTJ NEW/EST LOW 30: CPT | Performed by: OTOLARYNGOLOGY

## 2018-04-25 PROCEDURE — 92567 TYMPANOMETRY: CPT | Performed by: AUDIOLOGIST

## 2018-04-25 NOTE — PROGRESS NOTES
AUDIOLOGY REPORT:    Patient was referred to Audiology from ENT by Rios Medrano MD. for a hearing examination. Patient reports a ruptured ear drum on left, following an Q-Tip accident 2 weeks ago.    Testing:    Otoscopy:   Otoscopic exam indicates ears are clear of cerumen bilaterally, large perforation in left tympanic membrane.    Tympanograms:    RIGHT: negative pressure      LEFT:   large ear canal volume consistent with perforated eardrum.      Thresholds:   Pure Tone Thresholds assessed using conventional audiometry with good  reliability from 250-8000 Hz bilaterally using insert earphones     RIGHT:  normal hearing sensitivity    LEFT:    mild-moderate conductive hearing loss    Speech Reception Threshold:    RIGHT: 15 dB HL    LEFT:   35 dB HL    Word Recognition Score:     RIGHT: 100% at 55 dB HL using NU-6 recorded word list.    LEFT:   100% at 75 dB HL using NU-6 recorded word list.    Discussed results with the patient.     Patient was returned to ENT for follow up.     Benjamin Carmen MA, CCC-A  [place] Licensed Audiologist #[id number]  4/25/2018

## 2018-04-25 NOTE — PATIENT INSTRUCTIONS
General Scheduling Information  To schedule your CT/MRI scan, please contact [place] Jorge at [phone]   [address] [place] [address]    To schedule your Surgery, please contact our Specialty Schedulers at [phone]    ENT Clinic Locations Clinic Hours Telephone Number     Derik Gonzalez  [address] [place] [address]   Tuesday:       8:00am -- 4:00pm    Wednesday:  8:00am - 4:00pm   To schedule an appointment with   Dr. Medrano,   please contact our   Specialty Scheduling Department at:     [phone]       Derik Sykes  [address] [place] [address]   Friday:          8:00am - 4:00pm         Urgent Care Locations Clinic Hours Telephone Numbers     Derik Rojo  [address] [place] [address]     Monday-Friday:     11:00pm - 9:00pm    Saturday-Sunday:  9:00am - 5:00pm   [phone]     Derik Sykes  [address] [place] [address]     Monday-Friday:      5:00pm - 9:00pm     Saturday-Sunday:  9:00am - 5:00pm   [phone]

## 2018-04-25 NOTE — LETTER
4/25/2018         RE: Kevin Florian  [address] [facility] [address]        Dear Colleague,    Thank you for referring your patient, Kevin Florian, to the [facility]. Please see a copy of my visit note below.    Patient to follow up with Primary Care provider regarding elevated blood pressure.    I am seeing this patient in consultation for a tympanic membrane perforation at the request of the provider Dr. Rome Leslie.      Chief Complaint - Hearing loss    History of Present Illness - Kevin Florian is a 43 year old male who presents to me today with hearing loss or a plugged feeling in left ear. He had a q-tip jam into his left ear. It happened 2 weeks ago. He had pain and bleeding, but that has stopped. Hearing is down, has tinnitus. Right ear is okay.  There is no history of chronic ear disease or ear surgery. The patient denies vertigo. The patient has tried ear drops. He finished those. Nonsmoker. Son has a cholesteatoma.     Past Medical History -   Patient Active Problem List   Diagnosis     CARDIOVASCULAR SCREENING; LDL GOAL LESS THAN 160     Suicidal ideation     Alcohol abuse     Anxiety       Current Medications -   Current Outpatient Prescriptions:      fluticasone (FLONASE) 50 MCG/ACT nasal spray, INHALE 2 SPRAYS INTO EACH NOSTRIL EVERY DAY, Disp: 16 g, Rfl: 3     PARoxetine (PAXIL) 20 MG tablet, Take 1.5 tablets (30 mg) by mouth At Bedtime, Disp: 45 tablet, Rfl: 2    Allergies - No Known Allergies    Social History -   Social History     Social History     Marital status:      Spouse name: N/A     Number of children: N/A     Years of education: N/A     Social History Main Topics     Smoking status: Never Smoker     Smokeless tobacco: Current User     Alcohol use 0.0 oz/week     0 Standard drinks or equivalent per week      Comment: occasionally     Drug use: No     Sexual activity: Yes     Partners: Female     Birth control/ protection: Pill     Other  "Topics Concern     None     Social History Narrative       Family History -   Family History   Problem Relation Age of Onset     Hypertension Father      Hyperlipidemia Father      Prostate Cancer Father      Other Cancer Paternal Grandfather      melanoma     Anxiety Disorder Brother      DIABETES No family hx of      Coronary Artery Disease No family hx of      CEREBROVASCULAR DISEASE No family hx of      Breast Cancer No family hx of      Colon Cancer No family hx of      Substance Abuse No family hx of      Anesthesia Reaction No family hx of        Review of Systems - As per HPI and PMHx, otherwise 7 system review of the head and neck negative.    Physical Exam  BP (!) 145/103 (Cuff Size: Adult Large)  Pulse 71  Temp 98  F (36.7  C) (Oral)  Resp 16  Ht 1.727 m (5' 8\")  Wt 95.9 kg (211 lb 6.4 oz)  SpO2 97%  BMI 32.14 kg/m2  General - The patient is nontoxic, in no distress.  Alert and oriented to person and place, answers questions and cooperates with examination appropriately.   Voice and Breathing - The patient was breathing comfortably without the use of accessory muscles. There was no wheezing, stridor, or stertor.  The patients voice was clear and strong.  Ears - The tympanic membrane on the left is perforation inferior and posterior, 3 mm. Some tympanic membrane inflammation. No acute infection.  No fluid or purulence was seen in the external canal. The tympanic membrane on the right is intact, no middle ear effusion. No acute infection.  No fluid or purulence was seen in the external canal.   Eyes - Extraocular movements intact. Sclera were not icteric or injected.  Neck - Palpation of the occipital, submental, submandibular, internal jugular chain, and supraclavicular nodes did not demonstrate any abnormal lymph nodes or masses. No parotid masses. Palpation of the thyroid was soft and smooth, with no nodules or goiter appreciated.  The trachea was mobile and midline.  Neurological - Cranial nerves " 2 through 12 were grossly intact. House-Brackmann grade 1 out of 6 bilaterally.    Audiologic Studies - An audiogram and tympanogram were performed today as part of the evaluation and personally reviewed. The tympanogram shows a type B, high volume on the left.  The audiogram showed an air bone gap on the left,normal right hearing and type C tymp right.       Assessment and Plan - Keivn Florian is a 43 year old male who presents to me today with hearing loss, left ear due to tympanic membrane perforation. this happened with a q-tip. Has a conductive hearing loss. no infection. I advised time and water precautions. Return in 2-3 months. If this won't close on its own we can discuss tympanoplasty. Return with audiogram.    Rios Medrano MD  Otolaryngology  [facility]      Again, thank you for allowing me to participate in the care of your patient.        Sincerely,        Rios Medrano MD

## 2018-04-25 NOTE — MR AVS SNAPSHOT
"              After Visit Summary   4/25/2018    Kevin Florian    MRN: [id number]           Patient Information     Date Of Birth          1975        Visit Information        Provider Department      4/25/2018 10:00 AM Michael Carmen AuD [facility]        Today's Diagnoses     Conductive hearing loss of left ear with unrestricted hearing of right ear    -  1    Perforated tympanic membrane, left           Follow-ups after your visit        Your next 10 appointments already scheduled     Apr 25, 2018 10:30 AM CDT   New Visit with Rios Medrano MD   [facility] ([facility]    [address] [place] [address]   [phone]              Who to contact     If you have questions or need follow up information about today's clinic visit or your schedule please contact [facility] directly at [phone].  Normal or non-critical lab and imaging results will be communicated to you by MyChart, letter or phone within 4 business days after the clinic has received the results. If you do not hear from us within 7 days, please contact the clinic through MyChart or phone. If you have a critical or abnormal lab result, we will notify you by phone as soon as possible.  Submit refill requests through [employer] or call your pharmacy and they will forward the refill request to us. Please allow 3 business days for your refill to be completed.          Additional Information About Your Visit        MyChart Information     [employer] lets you send messages to your doctor, view your test results, renew your prescriptions, schedule appointments and more. To sign up, go to [email or web address] . Click on \"Log in\" on the left side of the screen, which will take you to the Welcome page. Then click on \"Sign up Now\" on the right side of the page.     You will be asked to enter the access code listed below, as well as some personal information. Please follow the " directions to create your username and password.     Your access code is: [email or web address]  Expires: 2018  2:36 PM     Your access code will  in 90 days. If you need help or a new code, please call your [facility] clinic or [phone].        Care EveryWhere ID     This is your Care EveryWhere ID. This could be used by other organizations to access your [place] medical records  [id number]         Blood Pressure from Last 3 Encounters:   18 136/82   18 122/90   17 132/82    Weight from Last 3 Encounters:   18 209 lb 6.4 oz (95 kg)   18 197 lb 4.8 oz (89.5 kg)   17 198 lb 4.8 oz (89.9 kg)              We Performed the Following     AUDIOGRAM/TYMPANOGRAM - INTERFACE     COMPREHENSIVE HEARING TEST     TYMPANOMETRY        Primary Care Provider Office Phone # Fax #    Rome Leslie [phone]       [address] [facility] [address]        Equal Access to Services     [facility]: Hadii aad ku hadasho Soomaali, waaxda luqadaha, qaybta kaalmada adeegyada, arnel snow . So [facility] [phone].    ATENCIÓN: Si habla español, tiene a curry disposición servicios gratuitos de asistencia lingüística. Llame al [phone].    We comply with applicable federal civil rights laws and [place] laws. We do not discriminate on the basis of race, color, national origin, age, disability, sex, sexual orientation, or gender identity.            Thank you!     Thank you for choosing [facility]  for your care. Our goal is always to provide you with excellent care. Hearing back from our patients is one way we can continue to improve our services. Please take a few minutes to complete the written survey that you may receive in the mail after your visit with us. Thank you!             Your Updated Medication List - Protect others around you: Learn how to safely use, store and throw away your medicines at www.disposemymeds.org.          This  list is accurate as of 4/25/18 10:16 AM.  Always use your most recent med list.                   Brand Name Dispense Instructions for use Diagnosis    fluticasone 50 MCG/ACT spray    FLONASE    16 g    INHALE 2 SPRAYS INTO EACH NOSTRIL EVERY DAY    Acute recurrent maxillary sinusitis       PARoxetine 20 MG tablet    PAXIL    45 tablet    Take 1.5 tablets (30 mg) by mouth At Bedtime    Anxiety, Alcohol abuse

## 2018-04-25 NOTE — MR AVS SNAPSHOT
After Visit Summary   4/25/2018    Kevin Florian    MRN: [id number]           Patient Information     Date Of Birth          1975        Visit Information        Provider Department      4/25/2018 10:30 AM Rios Medrano MD [facility]        Today's Diagnoses     Perforation of tympanic membrane, left    -  1    Conductive hearing loss, unilateral          Care Instructions    General Scheduling Information  To schedule your CT/MRI scan, please contact [facility] Jorge at [phone] [address] [place] [address]    To schedule your Surgery, please contact our Specialty Schedulers at [phone]    ENT Clinic Locations Clinic Hours Telephone Number     [place] Lisa  [address]  [place] [address]   Tuesday:       8:00am -- 4:00pm    Wednesday:  8:00am - 4:00pm   To schedule an appointment with   Dr. Medrano,   please contact our   Specialty Scheduling Department at:     [phone]       [facility]  [address] [place] [address]   Friday:          8:00am - 4:00pm         Urgent Care Locations Clinic Hours Telephone Numbers     [facility]  [address] [place] [address]     Monday-Friday:     11:00pm - 9:00pm    Saturday-Sunday:  9:00am - 5:00pm   [phone]     [facility] Onel  [address] [place] [address]     Monday-Friday:      5:00pm - 9:00pm     Saturday-Sunday:  9:00am - 5:00pm   [phone]                 Follow-ups after your visit        Additional Services     AUDIOLOGY ADULT REFERRAL       Your provider has referred you to: [facility] [facility] [phone]   [email or web address]    Treatment:  Evaluation & Treatment  Specialty Testing:  Audiogram w/Tymps and Reflexes    Please be aware that coverage of these services is subject to the terms and limitations of your health insurance plan.  Call member services at your health plan with any  "benefit or coverage questions.      Please bring the following to your appointment:  >>   Any x-rays, CTs or MRIs which have been performed.  Contact the facility where they were done to arrange for  prior to your scheduled appointment.   >>   List of current medications  >>   This referral request   >>   Any documents/labs given to you for this referral                  Who to contact     If you have questions or need follow up information about today's clinic visit or your schedule please contact [facility] directly at [phone].  Normal or non-critical lab and imaging results will be communicated to you by [employer], letter or phone within 4 business days after the clinic has received the results. If you do not hear from us within 7 days, please contact the clinic through [employer] or phone. If you have a critical or abnormal lab result, we will notify you by phone as soon as possible.  Submit refill requests through [employer] or call your pharmacy and they will forward the refill request to us. Please allow 3 business days for your refill to be completed.          Additional Information About Your Visit        [employer] Information     [employer] lets you send messages to your doctor, view your test results, renew your prescriptions, schedule appointments and more. To sign up, go to [email or web address] . Click on \"Log in\" on the left side of the screen, which will take you to the Welcome page. Then click on \"Sign up Now\" on the right side of the page.     You will be asked to enter the access code listed below, as well as some personal information. Please follow the directions to create your username and password.     Your access code is: [email or web address]  Expires: 2018  2:36 PM     Your access code will  in 90 days. If you need help or a new code, please call your [facility] or [phone].        Care EveryWhere ID     This is your Care EveryWhere ID. This could be used by other " "organizations to access your [facility] medical records  [id number]        Your Vitals Were     Pulse Temperature Respirations Height Pulse Oximetry BMI (Body Mass Index)    71 98  F (36.7  C) (Oral) 16 1.727 m (5' 8\") 97% 32.14 kg/m2       Blood Pressure from Last 3 Encounters:   04/25/18 (!) 145/103   04/13/18 136/82   02/08/18 122/90    Weight from Last 3 Encounters:   04/25/18 95.9 kg (211 lb 6.4 oz)   04/13/18 95 kg (209 lb 6.4 oz)   02/08/18 89.5 kg (197 lb 4.8 oz)              We Performed the Following     AUDIOLOGY ADULT REFERRAL        Primary Care Provider Office Phone # Fax #    Rome Leslie [phone]       [address] [facility] [address]        Equal Access to Services     [facility]: Hadii aad ku hadasho [place], waaxda luqadaha, qaybta kaalmada adecathleenyada, arnel snow . So [facility] [phone].    ATENCIÓN: Si habla español, tiene a curry disposición servicios gratuitos de asistencia lingüística. Frankieame al [phone].    We comply with applicable federal civil rights laws and [place] laws. We do not discriminate on the basis of race, color, national origin, age, disability, sex, sexual orientation, or gender identity.            Thank you!     Thank you for choosing [facility]  for your care. Our goal is always to provide you with excellent care. Hearing back from our patients is one way we can continue to improve our services. Please take a few minutes to complete the written survey that you may receive in the mail after your visit with us. Thank you!             Your Updated Medication List - Protect others around you: Learn how to safely use, store and throw away your medicines at www.disposemymeds.org.          This list is accurate as of 4/25/18 11:05 AM.  Always use your most recent med list.                   Brand Name Dispense Instructions for use Diagnosis    fluticasone 50 MCG/ACT spray    FLONASE    16 g    INHALE 2 SPRAYS " INTO EACH NOSTRIL EVERY DAY    Acute recurrent maxillary sinusitis       PARoxetine 20 MG tablet    PAXIL    45 tablet    Take 1.5 tablets (30 mg) by mouth At Bedtime    Anxiety, Alcohol abuse

## 2018-04-25 NOTE — PROGRESS NOTES
I am seeing this patient in consultation for a tympanic membrane perforation at the request of the provider Dr. Rome Leslie.      Chief Complaint - Hearing loss    History of Present Illness - Kevin Florian is a 43 year old male who presents to me today with hearing loss or a plugged feeling in left ear. He had a q-tip jam into his left ear. It happened 2 weeks ago. He had pain and bleeding, but that has stopped. Hearing is down, has tinnitus. Right ear is okay.  There is no history of chronic ear disease or ear surgery. The patient denies vertigo. The patient has tried ear drops. He finished those. Nonsmoker. Son has a cholesteatoma.     Past Medical History -   Patient Active Problem List   Diagnosis     CARDIOVASCULAR SCREENING; LDL GOAL LESS THAN 160     Suicidal ideation     Alcohol abuse     Anxiety       Current Medications -   Current Outpatient Prescriptions:      fluticasone (FLONASE) 50 MCG/ACT nasal spray, INHALE 2 SPRAYS INTO EACH NOSTRIL EVERY DAY, Disp: 16 g, Rfl: 3     PARoxetine (PAXIL) 20 MG tablet, Take 1.5 tablets (30 mg) by mouth At Bedtime, Disp: 45 tablet, Rfl: 2    Allergies - No Known Allergies    Social History -   Social History     Social History     Marital status:      Spouse name: N/A     Number of children: N/A     Years of education: N/A     Social History Main Topics     Smoking status: Never Smoker     Smokeless tobacco: Current User     Alcohol use 0.0 oz/week     0 Standard drinks or equivalent per week      Comment: occasionally     Drug use: No     Sexual activity: Yes     Partners: Female     Birth control/ protection: Pill     Other Topics Concern     None     Social History Narrative       Family History -   Family History   Problem Relation Age of Onset     Hypertension Father      Hyperlipidemia Father      Prostate Cancer Father      Other Cancer Paternal Grandfather      melanoma     Anxiety Disorder Brother      DIABETES No family hx of      Coronary Artery  "Disease No family hx of      CEREBROVASCULAR DISEASE No family hx of      Breast Cancer No family hx of      Colon Cancer No family hx of      Substance Abuse No family hx of      Anesthesia Reaction No family hx of        Review of Systems - As per HPI and PMHx, otherwise 7 system review of the head and neck negative.    Physical Exam  BP (!) 145/103 (Cuff Size: Adult Large)  Pulse 71  Temp 98  F (36.7  C) (Oral)  Resp 16  Ht 1.727 m (5' 8\")  Wt 95.9 kg (211 lb 6.4 oz)  SpO2 97%  BMI 32.14 kg/m2  General - The patient is nontoxic, in no distress.  Alert and oriented to person and place, answers questions and cooperates with examination appropriately.   Voice and Breathing - The patient was breathing comfortably without the use of accessory muscles. There was no wheezing, stridor, or stertor.  The patients voice was clear and strong.  Ears - The tympanic membrane on the left is perforation inferior and posterior, 3 mm. Some tympanic membrane inflammation. No acute infection.  No fluid or purulence was seen in the external canal. The tympanic membrane on the right is intact, no middle ear effusion. No acute infection.  No fluid or purulence was seen in the external canal.   Eyes - Extraocular movements intact. Sclera were not icteric or injected.  Neck - Palpation of the occipital, submental, submandibular, internal jugular chain, and supraclavicular nodes did not demonstrate any abnormal lymph nodes or masses. No parotid masses. Palpation of the thyroid was soft and smooth, with no nodules or goiter appreciated.  The trachea was mobile and midline.  Neurological - Cranial nerves 2 through 12 were grossly intact. House-Brackmann grade 1 out of 6 bilaterally.    Audiologic Studies - An audiogram and tympanogram were performed today as part of the evaluation and personally reviewed. The tympanogram shows a type B, high volume on the left.  The audiogram showed an air bone gap on the left,normal right hearing and " type C tymp right.       Assessment and Plan - Kevin COSTA Chiqui is a 43 year old male who presents to me today with hearing loss, left ear due to tympanic membrane perforation. this happened with a q-tip. Has a conductive hearing loss. no infection. I advised time and water precautions. Return in 2-3 months. If this won't close on its own we can discuss tympanoplasty. Return with audiogram.    Rios Medrano MD  Otolaryngology  [facility]

## 2018-08-21 DIAGNOSIS — F10.10 ALCOHOL ABUSE: ICD-10-CM

## 2018-08-21 DIAGNOSIS — F41.9 ANXIETY: ICD-10-CM

## 2018-08-21 RX ORDER — PAROXETINE 20 MG/1
30 TABLET, FILM COATED ORAL AT BEDTIME
Qty: 45 TABLET | Refills: 0 | Status: SHIPPED | OUTPATIENT
Start: 2018-08-21 | End: 2018-09-10

## 2018-08-21 NOTE — TELEPHONE ENCOUNTER
"Requested Prescriptions   Pending Prescriptions Disp Refills     PARoxetine (PAXIL) 20 MG tablet 45 tablet 2    Last Written Prescription Date:  2/8/18  Last Fill Quantity: 45,  # refills: 2   Last office visit: 4/13/2018 with prescribing provider:  4/13/18   Future Office Visit:     Sig: Take 1.5 tablets (30 mg) by mouth At Bedtime    SSRIs Protocol Passed    8/21/2018 12:34 PM       Passed - Recent (12 mo) or future (30 days) visit within the authorizing provider's specialty    Patient had office visit in the last 12 months or has a visit in the next 30 days with authorizing provider or within the authorizing provider's specialty.  See \"Patient Info\" tab in inbasket, or \"Choose Columns\" in Meds & Orders section of the refill encounter.           Passed - Patient is age 18 or older          "

## 2018-08-21 NOTE — TELEPHONE ENCOUNTER
Per last OV with Dr. Leslie, was to follow up in 6 weeks (note copied below).  1 month supply sent.     Please call patient to set up a recheck for anxiety with Dr. Leslie.       Chronic, improved with paxil. Still having sleep issues which may be part of underlying alcohol dependency. Will increase Paxil and recheck in 6 weeks. Strongly encourage follow up at [facility] for CD evaluation. Discussed with patient it is common for people with CD to convince themselves they have disease under control.   - PARoxetine (PAXIL) 20 MG tablet; Take 1.5 tablets (30 mg) by mouth At Bedtime  Dispense: 45 tablet; Refill: 2     FUTURE APPOINTMENTS:       - Follow-up visit in 6 weeks.      Nora Maloney RN. . .  8/21/2018, 2:39 PM

## 2018-09-10 ENCOUNTER — OFFICE VISIT (OUTPATIENT)
Dept: FAMILY MEDICINE | Facility: OTHER | Age: 43
End: 2018-09-10
Payer: COMMERCIAL

## 2018-09-10 VITALS
HEART RATE: 76 BPM | DIASTOLIC BLOOD PRESSURE: 86 MMHG | WEIGHT: 216.4 LBS | BODY MASS INDEX: 32.9 KG/M2 | RESPIRATION RATE: 14 BRPM | OXYGEN SATURATION: 97 % | SYSTOLIC BLOOD PRESSURE: 130 MMHG | TEMPERATURE: 97 F

## 2018-09-10 DIAGNOSIS — F10.10 ALCOHOL ABUSE: ICD-10-CM

## 2018-09-10 DIAGNOSIS — Z13.6 CARDIOVASCULAR SCREENING; LDL GOAL LESS THAN 160: Primary | ICD-10-CM

## 2018-09-10 DIAGNOSIS — F41.9 ANXIETY: ICD-10-CM

## 2018-09-10 LAB
CHOLEST SERPL-MCNC: 279 MG/DL
HDLC SERPL-MCNC: 67 MG/DL
LDLC SERPL CALC-MCNC: 188 MG/DL
NONHDLC SERPL-MCNC: 212 MG/DL
TRIGL SERPL-MCNC: 121 MG/DL

## 2018-09-10 PROCEDURE — 80061 LIPID PANEL: CPT | Performed by: FAMILY MEDICINE

## 2018-09-10 PROCEDURE — 99213 OFFICE O/P EST LOW 20 MIN: CPT | Performed by: FAMILY MEDICINE

## 2018-09-10 PROCEDURE — 36415 COLL VENOUS BLD VENIPUNCTURE: CPT | Performed by: FAMILY MEDICINE

## 2018-09-10 RX ORDER — PAROXETINE 20 MG/1
20 TABLET, FILM COATED ORAL AT BEDTIME
Qty: 45 TABLET | Refills: 0 | COMMUNITY
Start: 2018-09-10 | End: 2018-09-10

## 2018-09-10 RX ORDER — PAROXETINE 20 MG/1
20 TABLET, FILM COATED ORAL AT BEDTIME
Qty: 90 TABLET | Refills: 3 | Status: SHIPPED | OUTPATIENT
Start: 2018-09-10

## 2018-09-10 ASSESSMENT — ANXIETY QUESTIONNAIRES
IF YOU CHECKED OFF ANY PROBLEMS ON THIS QUESTIONNAIRE, HOW DIFFICULT HAVE THESE PROBLEMS MADE IT FOR YOU TO DO YOUR WORK, TAKE CARE OF THINGS AT HOME, OR GET ALONG WITH OTHER PEOPLE: NOT DIFFICULT AT ALL
7. FEELING AFRAID AS IF SOMETHING AWFUL MIGHT HAPPEN: NOT AT ALL
5. BEING SO RESTLESS THAT IT IS HARD TO SIT STILL: NOT AT ALL
3. WORRYING TOO MUCH ABOUT DIFFERENT THINGS: NOT AT ALL
2. NOT BEING ABLE TO STOP OR CONTROL WORRYING: NOT AT ALL
GAD7 TOTAL SCORE: 0
1. FEELING NERVOUS, ANXIOUS, OR ON EDGE: NOT AT ALL
6. BECOMING EASILY ANNOYED OR IRRITABLE: NOT AT ALL

## 2018-09-10 ASSESSMENT — PAIN SCALES - GENERAL: PAINLEVEL: NO PAIN (0)

## 2018-09-10 ASSESSMENT — PATIENT HEALTH QUESTIONNAIRE - PHQ9: 5. POOR APPETITE OR OVEREATING: NOT AT ALL

## 2018-09-10 NOTE — LETTER
September 10, 2018      Kevin Florian  [address] [facility] [id number]        Dear ,    We are writing to inform you of your test results.    Lipid profile has elevated LDL and total cholesterol. A low fat, low cholesterol is recommended.     Overall   The 10-year ASCVD risk score (Jarettjyoti GANDHI Jr, et al., 2013) is: 1.8%     Values used to calculate the score:       Age: 43 years       Sex: Male       Is Non- : No       Diabetic: No       Tobacco smoker: No       Systolic Blood Pressure: 130 mmHg       Is BP treated: No       HDL Cholesterol: 67 mg/dL       Total Cholesterol: 279 mg/dL     Statin therapy indicated if 10 year risk is > 10%.     Resulted Orders   Lipid panel reflex to direct LDL Fasting   Result Value Ref Range    Cholesterol 279 (H) <200 mg/dL      Comment:      Desirable:       <200 mg/dl    Triglycerides 121 <150 mg/dL    HDL Cholesterol 67 >39 mg/dL    LDL Cholesterol Calculated 188 (H) <100 mg/dL      Comment:      Above desirable:  100-129 mg/dl  Borderline High:  130-159 mg/dL  High:             160-189 mg/dL  Very high:       >189 mg/dl      Non HDL Cholesterol 212 (H) <130 mg/dL      Comment:      Above Desirable:  130-159 mg/dl  Borderline high:  160-189 mg/dl  High:             190-219 mg/dl  Very high:       >219 mg/dl         If you have any questions or concerns, please call the clinic at the number listed above.       Sincerely,        Rome Leslie MD

## 2018-09-10 NOTE — MR AVS SNAPSHOT
"              After Visit Summary   9/10/2018    Kevin Florian    MRN: [id number]           Patient Information     Date Of Birth          1975        Visit Information        Provider Department      9/10/2018 9:40 AM Rome Leslie MD [facility]        Today's Diagnoses     CARDIOVASCULAR SCREENING; LDL GOAL LESS THAN 160    -  1    Anxiety        Alcohol abuse           Follow-ups after your visit        Follow-up notes from your care team     Return in about 6 months (around 3/10/2019) for recheck depression.      Who to contact     If you have questions or need follow up information about today's clinic visit or your schedule please contact [facility] directly at [phone].  Normal or non-critical lab and imaging results will be communicated to you by MyChart, letter or phone within 4 business days after the clinic has received the results. If you do not hear from us within 7 days, please contact the clinic through MyChart or phone. If you have a critical or abnormal lab result, we will notify you by phone as soon as possible.  Submit refill requests through [employer] or call your pharmacy and they will forward the refill request to us. Please allow 3 business days for your refill to be completed.          Additional Information About Your Visit        MyChart Information     [employer] lets you send messages to your doctor, view your test results, renew your prescriptions, schedule appointments and more. To sign up, go to [email or web address] . Click on \"Log in\" on the left side of the screen, which will take you to the Welcome page. Then click on \"Sign up Now\" on the right side of the page.     You will be asked to enter the access code listed below, as well as some personal information. Please follow the directions to create your username and password.     Your access code is: [email or web address]  Expires: 2018  9:25 AM     Your access code will  in 90 days. If you " need help or a new code, please call your [place] clinic or [phone].        Care EveryWhere ID     This is your Care EveryWhere ID. This could be used by other organizations to access your [place] medical records  [id number]        Your Vitals Were     Pulse Temperature Respirations Pulse Oximetry BMI (Body Mass Index)       76 97  F (36.1  C) (Temporal) 14 97% 32.9 kg/m2        Blood Pressure from Last 3 Encounters:   09/10/18 130/86   04/25/18 (!) 145/103   04/13/18 136/82    Weight from Last 3 Encounters:   09/10/18 216 lb 6.4 oz (98.2 kg)   04/25/18 211 lb 6.4 oz (95.9 kg)   04/13/18 209 lb 6.4 oz (95 kg)              We Performed the Following     Lipid panel reflex to direct LDL Fasting          Today's Medication Changes          These changes are accurate as of 9/10/18 10:04 AM.  If you have any questions, ask your nurse or doctor.               Start taking these medicines.        Dose/Directions    PARoxetine 20 MG tablet   Commonly known as:  PAXIL   Used for:  Anxiety, Alcohol abuse   Started by:  Rome Leslie MD        Dose:  20 mg   Take 1 tablet (20 mg) by mouth At Bedtime   Quantity:  90 tablet   Refills:  3            Where to get your medicines      These medications were sent to [facility] [place] [facility] [address]   [address] [place] [id number]     Phone:  [phone]     PARoxetine 20 MG tablet                Primary Care Provider Office Phone # Fax #    Rome Leslie [phone]       [address] [place] [id number]        Equal Access to Services     [facility] AH: Hadii karissa ku hadasho Soomaali, waaxda luqadaha, qaybta kaalmada yusraegyada, arnel hernandez. So [facility] [phone].    ATENCIÓN: Si habla español, tiene a curry disposición servicios gratuitos de asistencia lingüística. Llame al [phone].    We comply with applicable federal civil rights laws and [place] laws. We do not discriminate on the basis of race,  color, national origin, age, disability, sex, sexual orientation, or gender identity.            Thank you!     Thank you for choosing [facility]  for your care. Our goal is always to provide you with excellent care. Hearing back from our patients is one way we can continue to improve our services. Please take a few minutes to complete the written survey that you may receive in the mail after your visit with us. Thank you!             Your Updated Medication List - Protect others around you: Learn how to safely use, store and throw away your medicines at www.disposemymeds.org.          This list is accurate as of 9/10/18 10:04 AM.  Always use your most recent med list.                   Brand Name Dispense Instructions for use Diagnosis    fluticasone 50 MCG/ACT spray    FLONASE    16 g    INHALE 2 SPRAYS INTO EACH NOSTRIL EVERY DAY    Acute recurrent maxillary sinusitis       PARoxetine 20 MG tablet    PAXIL    90 tablet    Take 1 tablet (20 mg) by mouth At Bedtime    Anxiety, Alcohol abuse

## 2018-09-10 NOTE — PROGRESS NOTES
SUBJECTIVE:   Kevin Florian is a 43 year old male who presents to clinic today for the following health issues:        Depression and Anxiety Follow-Up    Status since last visit: Improved     Other associated symptoms:None    Complicating factors:     Significant life event: No     Current substance abuse: None    PHQ-9 12/28/2017 2/8/2018 9/10/2018   Total Score 7 4 2   Q9: Suicide Ideation Not at all Not at all Not at all     LEXA-7 SCORE 12/28/2017 2/8/2018 9/10/2018   Total Score 15 1 0       PHQ-9  English  PHQ-9   Any Language  LEXA-7  Suicide Assessment Five-step Evaluation and Treatment (SAFE-T)    Amount of exercise or physical activity: 1 day/week for an average of 15-30 minutes    Problems taking medications regularly: No    Medication side effects: 1.5 tab was to much went back to 1 tab at bedtime    Diet: regular (no restrictions)            Problem list and histories reviewed & adjusted, as indicated.  Additional history: as documented    Patient Active Problem List   Diagnosis     CARDIOVASCULAR SCREENING; LDL GOAL LESS THAN 160     Suicidal ideation     Alcohol abuse     Anxiety     Past Surgical History:   Procedure Laterality Date     HERNIA REPAIR, INGUINAL RT/LT  1995    Left     LAPAROSCOPIC HERNIORRHAPHY INGUINAL Left 12/17/2015    Procedure: LAPAROSCOPIC HERNIORRHAPHY INGUINAL;  Surgeon: Larry Herrera MD;  Location: [facility] OR     VASECTOMY         Social History   Substance Use Topics     Smoking status: Never Smoker     Smokeless tobacco: Current User     Alcohol use 0.0 oz/week     0 Standard drinks or equivalent per week      Comment: occasionally     Family History   Problem Relation Age of Onset     Hypertension Father      Hyperlipidemia Father      Prostate Cancer Father      Other Cancer Paternal Grandfather      melanoma     Anxiety Disorder Brother      Diabetes No family hx of      Coronary Artery Disease No family hx of      Cerebrovascular Disease No family hx of       Breast Cancer No family hx of      Colon Cancer No family hx of      Substance Abuse No family hx of      Anesthesia Reaction No family hx of          Current Outpatient Prescriptions   Medication Sig Dispense Refill     PARoxetine (PAXIL) 20 MG tablet Take 1 tablet (20 mg) by mouth At Bedtime 45 tablet 0     fluticasone (FLONASE) 50 MCG/ACT nasal spray INHALE 2 SPRAYS INTO EACH NOSTRIL EVERY DAY (Patient not taking: Reported on 9/10/2018) 16 g 3     [DISCONTINUED] PARoxetine (PAXIL) 20 MG tablet Take 1.5 tablets (30 mg) by mouth At Bedtime 45 tablet 0     No Known Allergies  Recent Labs   Lab Test  11/01/15   1640   CR  0.96   GFRESTIMATED  87   GFRESTBLACK  >90   GFR Calc     POTASSIUM  3.3*      BP Readings from Last 3 Encounters:   09/10/18 130/86   04/25/18 (!) 145/103   04/13/18 136/82    Wt Readings from Last 3 Encounters:   09/10/18 216 lb 6.4 oz (98.2 kg)   04/25/18 211 lb 6.4 oz (95.9 kg)   04/13/18 209 lb 6.4 oz (95 kg)                  Labs reviewed in EPIC    Reviewed and updated as needed this visit by clinical staff  Tobacco  Allergies  Meds  Problems  Med Hx  Surg Hx  Fam Hx  Soc Hx        Reviewed and updated as needed this visit by Provider  Tobacco  Allergies  Meds  Problems         ROS:  CONSTITUTIONAL: NEGATIVE for fever, chills, change in weight  ENT/MOUTH: NEGATIVE for ear, mouth and throat problems  RESP: NEGATIVE for significant cough or SOB  CV: NEGATIVE for chest pain, palpitations or peripheral edema  PSYCHIATRIC: POSITIVE for history of alcohol abuse, Hx anxiety and Hx depression and NEGATIVE foralcohol abuse, anhedonia, depressed mood and thoughts of self harm  ROS otherwise negative    OBJECTIVE:     /86 (BP Location: Left arm, Patient Position: Sitting, Cuff Size: Adult Large)  Pulse 76  Temp 97  F (36.1  C) (Temporal)  Resp 14  Wt 216 lb 6.4 oz (98.2 kg)  SpO2 97%  BMI 32.9 kg/m2  Body mass index is 32.9 kg/(m^2).  GENERAL: healthy, alert and no  distress  NECK: no adenopathy, no asymmetry, masses, or scars and thyroid normal to palpation  RESP: lungs clear to auscultation - no rales, rhonchi or wheezes  CV: regular rate and rhythm, normal S1 S2, no S3 or S4, no murmur, click or rub, no peripheral edema and peripheral pulses strong  ABDOMEN: soft, nontender, no hepatosplenomegaly, no masses and bowel sounds normal  MS: no gross musculoskeletal defects noted, no edema  PSYCH: mentation appears normal, affect normal/bright    Diagnostic Test Results:  No results found for this or any previous visit (from the past 24 hour(s)).    ASSESSMENT/PLAN:     1. Anxiety  Chronic stable. The current medical regimen is effective;  continue present plan and medications. Recheck in 6 months  - PARoxetine (PAXIL) 20 MG tablet; Take 1 tablet (20 mg) by mouth At Bedtime  Dispense: 90 tablet; Refill: 3    2. Alcohol abuse  Chronic remains sober. The current medical regimen is effective;  continue present plan and medications. Recheck in 6 months  - PARoxetine (PAXIL) 20 MG tablet; Take 1 tablet (20 mg) by mouth At Bedtime  Dispense: 90 tablet; Refill: 3    3. CARDIOVASCULAR SCREENING; LDL GOAL LESS THAN 160  - Lipid panel reflex to direct LDL Fasting    FUTURE APPOINTMENTS:       - Follow-up visit in 6 months recheck depression and anxiety.    Rome Leslie MD  [facility]

## 2018-09-11 ASSESSMENT — PATIENT HEALTH QUESTIONNAIRE - PHQ9: SUM OF ALL RESPONSES TO PHQ QUESTIONS 1-9: 2

## 2018-09-11 ASSESSMENT — ANXIETY QUESTIONNAIRES: GAD7 TOTAL SCORE: 0

## 2019-04-02 ENCOUNTER — OFFICE VISIT (OUTPATIENT)
Dept: FAMILY MEDICINE | Facility: OTHER | Age: 44
End: 2019-04-02
Payer: COMMERCIAL

## 2019-04-02 VITALS
HEIGHT: 68 IN | BODY MASS INDEX: 32.63 KG/M2 | RESPIRATION RATE: 20 BRPM | DIASTOLIC BLOOD PRESSURE: 82 MMHG | TEMPERATURE: 96.8 F | WEIGHT: 215.3 LBS | HEART RATE: 80 BPM | SYSTOLIC BLOOD PRESSURE: 122 MMHG

## 2019-04-02 DIAGNOSIS — H65.01 RIGHT ACUTE SEROUS OTITIS MEDIA, RECURRENCE NOT SPECIFIED: Primary | ICD-10-CM

## 2019-04-02 PROCEDURE — 99213 OFFICE O/P EST LOW 20 MIN: CPT | Performed by: FAMILY MEDICINE

## 2019-04-02 RX ORDER — AMOXICILLIN 500 MG/1
500 CAPSULE ORAL 2 TIMES DAILY
Qty: 14 CAPSULE | Refills: 0 | Status: SHIPPED | OUTPATIENT
Start: 2019-04-02 | End: 2019-04-09

## 2019-04-02 ASSESSMENT — MIFFLIN-ST. JEOR: SCORE: 1846.09

## 2019-04-02 ASSESSMENT — PAIN SCALES - GENERAL: PAINLEVEL: MODERATE PAIN (4)

## 2019-04-02 NOTE — PROGRESS NOTES
"  SUBJECTIVE:   Kevin Florian is a 44 year old male who presents to clinic today for the following health issues:      RESPIRATORY SYMPTOMS      Duration: x's 4 days    Description  nasal congestion, rhinorrhea, ear pain right and headache    Severity: moderate    Accompanying signs and symptoms: None    History (predisposing factors):  tobacco abuse    Precipitating or alleviating factors: None    Therapies tried and outcome:  OTC NSAID      SUBJECTIVE:    Kevin is a 44 year old male presenting with uri complaints as noted above    No past medical history on file.    Current Outpatient Medications   Medication Sig Dispense Refill     amoxicillin (AMOXIL) 500 MG capsule Take 1 capsule (500 mg) by mouth 2 times daily for 7 days 14 capsule 0     fluticasone (FLONASE) 50 MCG/ACT nasal spray INHALE 2 SPRAYS INTO EACH NOSTRIL EVERY DAY 16 g 3     PARoxetine (PAXIL) 20 MG tablet Take 1 tablet (20 mg) by mouth At Bedtime 90 tablet 3       OBJECTIVE:  /82   Pulse 80   Temp 96.8  F (36  C) (Temporal)   Resp 20   Ht 1.727 m (5' 8\")   Wt 97.7 kg (215 lb 4.8 oz)   BMI 32.74 kg/m      General appearance: alert and in no apparent distress  Skin color is pink  Hydration status appears adequate with normal skin turgor and moist mucous membranes.    HEENT:     Left TM is normal: no effusions, no erythema, and normal landmarks.  Right TM  bullae present, distorted light reflex and slightly erythematous.  Oropharyngeal exam is normal: no lesions, erythema, adenopathy or exudate.  Neck is supple with no adenopathy    CARDIAC:NORMAL - regular rate and rhythm without murmur.  RESP: Normal - Clear to auscultation without rales, rhonchi, or wheezing.  ABDOMEN: ne      ASSESSMENT:  Acute right otitis media    PLAN:  Tylenol, Ibuprofen and rx amoxicillin, flonase  Pt to call back if no improvement in 5-7 days, call back sooner if new or increased symptoms.    dbue        "

## 2020-02-10 ENCOUNTER — HEALTH MAINTENANCE LETTER (OUTPATIENT)
Age: 45
End: 2020-02-10

## 2020-11-14 ENCOUNTER — HEALTH MAINTENANCE LETTER (OUTPATIENT)
Age: 45
End: 2020-11-14

## 2021-03-28 ENCOUNTER — HEALTH MAINTENANCE LETTER (OUTPATIENT)
Age: 46
End: 2021-03-28

## 2021-09-12 ENCOUNTER — HEALTH MAINTENANCE LETTER (OUTPATIENT)
Age: 46
End: 2021-09-12

## 2022-04-24 ENCOUNTER — HEALTH MAINTENANCE LETTER (OUTPATIENT)
Age: 47
End: 2022-04-24

## 2022-11-19 ENCOUNTER — HEALTH MAINTENANCE LETTER (OUTPATIENT)
Age: 47
End: 2022-11-19

## 2022-11-22 ENCOUNTER — E-VISIT (OUTPATIENT)
Dept: URGENT CARE | Facility: CLINIC | Age: 47
End: 2022-11-22
Payer: COMMERCIAL

## 2022-11-22 DIAGNOSIS — R39.9 URINARY SYMPTOM OR SIGN: Primary | ICD-10-CM

## 2022-11-22 PROCEDURE — 99207 PR NON-BILLABLE SERV PER CHARTING: CPT | Performed by: PHYSICIAN ASSISTANT

## 2022-11-22 NOTE — PATIENT INSTRUCTIONS
Dear Kevin Florian    After reviewing your responses, I am unable to make a diagnosis that can be treated online.    You will not be charged for this eVisit.     We are dedicated to helping you achieve your best health and would like to see you in one of our many clinic locations - a primary care provider would be ideal for your concern.    Please use [employer] to schedule a visit with a provider or call [phone] to schedule at any of our locations.    Thanks for choosing?us?as your health care partner,?   ?  Alondra Sauceda PA-C, [facility]  11/22/2022  4:56 AM

## 2023-06-01 ENCOUNTER — HEALTH MAINTENANCE LETTER (OUTPATIENT)
Age: 48
End: 2023-06-01

## 2024-06-15 ENCOUNTER — HEALTH MAINTENANCE LETTER (OUTPATIENT)
Age: 49
End: 2024-06-15

## 2024-09-05 NOTE — PROGRESS NOTES
SUBJECTIVE:   Kevin Florian is a 42 year old male who presents to clinic today for the following health issues:        Depression and Anxiety Follow-Up    Status since last visit: Improved.     Other associated symptoms:sleeping    Complicating factors:     Significant life event: No     Current substance abuse: None    PHQ-9 12/28/2017 2/8/2018   Total Score 7 4   Q9: Suicide Ideation Not at all Not at all     LEXA-7 SCORE 11/11/2015 12/28/2017 2/8/2018   Total Score 9 15 1       PHQ-9  English  PHQ-9   Any Language  LEXA-7  Suicide Assessment Five-step Evaluation and Treatment (SAFE-T)    Amount of exercise or physical activity: 1 day/week for an average of 15-30 minutes    Problems taking medications regularly: No    Medication side effects:  not getting deep sleep    Diet: regular (no restrictions)        Problem list and histories reviewed & adjusted, as indicated.  Additional history: He initially slept well on the Paxil but doesn't feel his sleep is as restful as he wants. He was referred to [facility] for alcohol evaluation. He made the appointment but feels he can control his drinking on his own. He has continued to drink alcohol socially.     Patient Active Problem List   Diagnosis     CARDIOVASCULAR SCREENING; LDL GOAL LESS THAN 160     Suicidal ideation     Alcohol abuse     Anxiety     Past Surgical History:   Procedure Laterality Date     HERNIA REPAIR, INGUINAL RT/LT  1995    Left     LAPAROSCOPIC HERNIORRHAPHY INGUINAL Left 12/17/2015    Procedure: LAPAROSCOPIC HERNIORRHAPHY INGUINAL;  Surgeon: Larry Herrera MD;  Location: [facility] OR     VASECTOMY         Social History   Substance Use Topics     Smoking status: Never Smoker     Smokeless tobacco: Current User     Alcohol use 0.0 oz/week     0 Standard drinks or equivalent per week     Family History   Problem Relation Age of Onset     Hypertension Father      Hyperlipidemia Father      Prostate Cancer Father      Other  Will start the patient on acyclovir 400 mg daily and prescription written   Cancer Paternal Grandfather      melanoma     Anxiety Disorder Brother      DIABETES No family hx of      Coronary Artery Disease No family hx of      CEREBROVASCULAR DISEASE No family hx of      Breast Cancer No family hx of      Colon Cancer No family hx of      Substance Abuse No family hx of      Anesthesia Reaction No family hx of          Current Outpatient Prescriptions   Medication Sig Dispense Refill     PARoxetine (PAXIL) 20 MG tablet Take 1 tablet (20 mg) by mouth At Bedtime 30 tablet 1     fluticasone (FLONASE) 50 MCG/ACT nasal spray INHALE 2 SPRAYS INTO EACH NOSTRIL EVERY DAY 16 g 3     No Known Allergies  Recent Labs   Lab Test  11/01/15   1640   CR  0.96   GFRESTIMATED  87   GFRESTBLACK  >90   GFR Calc     POTASSIUM  3.3*      BP Readings from Last 3 Encounters:   02/08/18 122/90   12/28/17 132/82   05/10/16 149/81    Wt Readings from Last 3 Encounters:   02/08/18 197 lb 4.8 oz (89.5 kg)   12/28/17 198 lb 4.8 oz (89.9 kg)   05/10/16 195 lb (88.5 kg)                  Labs reviewed in EPIC    Reviewed and updated as needed this visit by clinical staff  Tobacco  Allergies  Meds  Problems  Med Hx  Surg Hx  Fam Hx  Soc Hx        Reviewed and updated as needed this visit by Provider  Allergies  Meds  Problems         ROS:  Constitutional, HEENT, cardiovascular, pulmonary, gi and gu systems are negative, except as otherwise noted.    OBJECTIVE:     /90 (BP Location: Left arm, Patient Position: Chair, Cuff Size: Adult Large)  Pulse 105  Temp 96.9  F (36.1  C) (Temporal)  Resp 16  Wt 197 lb 4.8 oz (89.5 kg)  SpO2 98%  BMI 29.82 kg/m2  Body mass index is 29.82 kg/(m^2).  GENERAL: healthy, alert and no distress  NECK: no adenopathy, no asymmetry, masses, or scars and thyroid normal to palpation  RESP: lungs clear to auscultation - no rales, rhonchi or wheezes  CV: regular rate and rhythm, normal S1 S2, no S3 or S4, no murmur, click or rub, no peripheral edema and peripheral  pulses strong  ABDOMEN: soft, nontender, no hepatosplenomegaly, no masses and bowel sounds normal  MS: no gross musculoskeletal defects noted, no edema  PSYCH: mentation appears normal, affect normal/bright    Diagnostic Test Results:  none     ASSESSMENT/PLAN:     1. Anxiety  Chronic, improved with Paxil. Still having sleep issues which may be part of underlying alcohol dependency. Will increase Paxil and recheck in 6 weeks.  - PARoxetine (PAXIL) 20 MG tablet; Take 1.5 tablets (30 mg) by mouth At Bedtime  Dispense: 45 tablet; Refill: 2    2. Alcohol abuse  Chronic, improved with paxil. Still having sleep issues which may be part of underlying alcohol dependency. Will increase Paxil and recheck in 6 weeks. Strongly encourage follow up at [facility] for CD evaluation. Discussed with patient it is common for people with CD to convince themselves they have disease under control.   - PARoxetine (PAXIL) 20 MG tablet; Take 1.5 tablets (30 mg) by mouth At Bedtime  Dispense: 45 tablet; Refill: 2    FUTURE APPOINTMENTS:       - Follow-up visit in 6 weeks.    Rome Leslie MD  [facility]

## 2025-07-06 ENCOUNTER — HEALTH MAINTENANCE LETTER (OUTPATIENT)
Age: 50
End: 2025-07-06